# Patient Record
Sex: FEMALE | Race: WHITE | NOT HISPANIC OR LATINO | URBAN - METROPOLITAN AREA
[De-identification: names, ages, dates, MRNs, and addresses within clinical notes are randomized per-mention and may not be internally consistent; named-entity substitution may affect disease eponyms.]

---

## 2018-03-20 ENCOUNTER — APPOINTMENT (RX ONLY)
Dept: URBAN - METROPOLITAN AREA CLINIC 31 | Facility: CLINIC | Age: 73
Setting detail: DERMATOLOGY
End: 2018-03-20

## 2018-03-20 DIAGNOSIS — L57.8 OTHER SKIN CHANGES DUE TO CHRONIC EXPOSURE TO NONIONIZING RADIATION: ICD-10-CM

## 2018-03-20 DIAGNOSIS — L81.4 OTHER MELANIN HYPERPIGMENTATION: ICD-10-CM

## 2018-03-20 DIAGNOSIS — D18.0 HEMANGIOMA: ICD-10-CM

## 2018-03-20 DIAGNOSIS — D22 MELANOCYTIC NEVI: ICD-10-CM

## 2018-03-20 DIAGNOSIS — L82.1 OTHER SEBORRHEIC KERATOSIS: ICD-10-CM

## 2018-03-20 PROBLEM — D18.01 HEMANGIOMA OF SKIN AND SUBCUTANEOUS TISSUE: Status: ACTIVE | Noted: 2018-03-20

## 2018-03-20 PROBLEM — D22.5 MELANOCYTIC NEVI OF TRUNK: Status: ACTIVE | Noted: 2018-03-20

## 2018-03-20 PROBLEM — L57.0 ACTINIC KERATOSIS: Status: ACTIVE | Noted: 2018-03-20

## 2018-03-20 PROBLEM — E78.5 HYPERLIPIDEMIA, UNSPECIFIED: Status: ACTIVE | Noted: 2018-03-20

## 2018-03-20 PROBLEM — I10 ESSENTIAL (PRIMARY) HYPERTENSION: Status: ACTIVE | Noted: 2018-03-20

## 2018-03-20 PROCEDURE — ? COUNSELING

## 2018-03-20 PROCEDURE — 99213 OFFICE O/P EST LOW 20 MIN: CPT

## 2018-03-20 ASSESSMENT — LOCATION DETAILED DESCRIPTION DERM
LOCATION DETAILED: RIGHT INFERIOR CENTRAL MALAR CHEEK
LOCATION DETAILED: LEFT PROXIMAL DORSAL FOREARM
LOCATION DETAILED: INFERIOR THORACIC SPINE
LOCATION DETAILED: RIGHT DISTAL DORSAL FOREARM
LOCATION DETAILED: LEFT DISTAL DORSAL FOREARM
LOCATION DETAILED: STERNAL NOTCH
LOCATION DETAILED: LEFT INFERIOR CENTRAL MALAR CHEEK
LOCATION DETAILED: UPPER STERNUM
LOCATION DETAILED: LEFT INFERIOR MEDIAL MIDBACK
LOCATION DETAILED: RIGHT PROXIMAL DORSAL FOREARM
LOCATION DETAILED: RIGHT SUPERIOR MEDIAL MIDBACK

## 2018-03-20 ASSESSMENT — LOCATION SIMPLE DESCRIPTION DERM
LOCATION SIMPLE: UPPER BACK
LOCATION SIMPLE: CHEST
LOCATION SIMPLE: LEFT LOWER BACK
LOCATION SIMPLE: LEFT FOREARM
LOCATION SIMPLE: RIGHT CHEEK
LOCATION SIMPLE: RIGHT FOREARM
LOCATION SIMPLE: RIGHT LOWER BACK
LOCATION SIMPLE: LEFT CHEEK

## 2018-03-20 ASSESSMENT — LOCATION ZONE DERM
LOCATION ZONE: TRUNK
LOCATION ZONE: ARM
LOCATION ZONE: FACE

## 2022-12-13 ENCOUNTER — PHYSICAL THERAPY (OUTPATIENT)
Dept: PHYSICAL THERAPY | Facility: REHABILITATION | Age: 77
End: 2022-12-13
Attending: SURGERY
Payer: MEDICARE

## 2022-12-13 DIAGNOSIS — I89.0 LYMPHEDEMA: ICD-10-CM

## 2022-12-13 PROCEDURE — 97161 PT EVAL LOW COMPLEX 20 MIN: CPT

## 2022-12-13 NOTE — OP THERAPY EVALUATION
"  Outpatient Physical Therapy  LYMPHEDEMA THERAPY INITIAL EVALUATION    [facility] Physical Therapy [address]  [address]  [place] [address]  Phone:  [phone]  Fax:  [phone]    Date of Evaluation: 12/13/2022    Patient: Charity Zarate  YOB: 1945  MRN: [id number]     Referring Provider: Brian Crow M.D.  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 Lymphedema  I89.0       Subjective:   History of Present Illness:     Mechanism of injury:  Per chart 4/24/19: \"73-year-old woman with right upper outer breast clinical stage IIIa T2 N2 aM0 invasive ductal carcinoma, grade 3, ER WA positive HER-2/marta negative, post neoadjuvant TC x4, right breast lumpectomy, ALND, and bilateral reduction mammoplasty with residual YPT2N2 cancer, RCB class III\"    In the first part of November, pt reports her R UE flared quite a bit. She unfortunately for the past eight months she was concerned regarding her fatigue levels and in late June-August had been consistently misdiagnosed until November 15 where she learned she has had a recurrence of her cancer. She reports her R breast is now twice as big as her L breast. Her R arm is also as large such that she cannot lift it without assist from her L arm. She is to have a PET scan soon.     Has a Tactile Flexitouch and uses it regularly. Also performs self-MLD. She was seeing a CDT in [place] but unfortunately her therapist went out on maternity leave. Does have compression garments but now they do not fit.     No past medical history on file.  No past surgical history on file.  Social " "History     Tobacco Use    Smoking status: Not on file    Smokeless tobacco: Not on file   Substance Use Topics    Alcohol use: Not on file     Family and Occupational History     Socioeconomic History    Marital status:      Spouse name: Not on file    Number of children: Not on file    Years of education: Not on file    Highest education level: Not on file   Occupational History    Not on file       Lymphedema Objective      Left Upper Extremity Circumferential Measurements  Other: cm  Areola: cm  Breast Crease: cm  Axilla: cm  Upper Proximal Humerus: 37.1 cm  Distal Humerus: 33.2 cm  Elbow: 27.3 cm  Mid-Forearm: 25.4 cm  Wrist: 15.9 cm  Distal Briggs Crease: 17.9 cm  Total: 156.8 cm    Right Upper Extremity Circumferential Measurements  Other: cm  Areola: cm  Breast Crease: cm  Axilla: 41.2 cm  Upper Proximal Humerus: 39.4 cm  Distal Humerus: 35.4 cm  Elbow: 32.1 cm  Mid-Forearm: 31.5 cm  Wrist: 17.6 cm  Distal Briggs Crease: 18.4 cm  Total: cm 215.6      Lymphedema Stage  Stage 3 Lymphedema    Other Notes  5. Distal Humerus: below the \"scar\"    ROM:    L UE: without impairment, Full ROM in all planes    R UE: flexion to 15degrees. Abduction: 10degrees. Unable to horizontally ab/adduct. Supination/pronation intact. For any motion beyond: must be active assist. Passively, unable to lift arm greater than 80degrees into both flexion and abduction. Firm endfeel.       Therapeutic Treatments and Modalities:     Therapeutic Treatment and Modalities Summary: -Educated pt on the components of CDT. Pt is familiar with the pathogenesis of lymphedema as she was previously seen during her first bout of cancer.     -discussed, in detail, the risk of wrapping arm today to achieve reduction. Risks include spreading disease through the lymphatics. Pt would like her arm to be wrapped to improve her quality of life/functionality. She is unable to lift arm overhead, dress without assist from L UE, wash hair with R UE. She " has been informed of the possibility of cancer spread. She has consented to be wrapped and was therefore wrapped in multilayer compression to decongest her R UE.   Time-based treatments/modalities:           Assessment and Plan:   Functional Impairments: swelling    Assessment details:  Pt is a 76yo F with hx of R breast cancer, treated with lumpectomy, chemo, and radiation. Eight months ago, she noticed tissue changes to her R breast and skin. She is currently undergoing workup for cancer recurrence and unfortunately has suffered a significant increase in swelling to her R arm and breast such that she cannot perform basic activities such as dressing, washing hair, or reaching to cabinets with her R UE. She arrives today searching for a method to reduce the R UE. Certainly, Charity is demonstrating a severe flare in her lymphedema, likely due to recurrence. She has been educated on Complete Decongestive Therapy and the possibility of spreading disease with the efforts to reduce her arm/breast. Charity has verbalized understanding to the risk and wishes to proceed with reduction to improve her quality of life. Services will be provided by a certified lymphedema therapist.  Complete decongestive therapy is considered the gold standard of medical practice for lymphedema treatment and has proven to be effective for reduction in limb volume size and decrease risk of complications secondary to swelling (such as wounds and infections).    Patient to be seen until decongestion occurs. Physical therapy interventions to include manual lymphatic drainage, compression bandaging, skin care education, wound care if applicable, therapeutic exercises, custom garment fitting and lymphedema education to improve skin integrity, decrease lymphedema swelling, improve joint arthrokinematics and range of motion and improve quality of life.    Spontaneous recovery is not expected without skilled completed decongestive lymphedema  therapy.    The patient was informed of evaluation findings and intervention plan and agrees to participate in the plan as outlined.   Prognosis: fair      Goals:   Short Term Goals:  1. Pt will achieve a total limb circumference reduction of 10cm in order to decrease risk for infection and progression of disease process.  2. Pt will be independent with self-MLD to facilitate fluid migration to healthy tissues and lymph nodes.   3. Pt will consistently perform exercises with bandages on to facilitate muscle pump of fluid from affected extremity.     Short term goal time span:  2-4 weeks    Long Term Goals:  1. Pt will have a reduction in total limb circumference of 20cm in order to decrease risk for infection and progression of disease process.   2. Patient will be independent with maintenance phase management of lymphedema including: compression garments, skin care education, risk reduction strategies, manual lymphatic drainage, and therapeutic exercise.   Long term goal time span:  4-6 weeks    Plan:  Therapy options:  Physical therapy treatment to continue  Planned therapy interventions:  Compression bandaging, compression sleeve, compression glove, decongestive exercises, home exercise program, intermittent compression, manual lymph drainage, Velcro wraps, strengthening exercises, soft tissue manual techniques (CPT 73306), skin/wound care, short stretch bandages, sequential compression pump, self-care/training (CPT 70342), referral for compression garment & instructions for don/doffing, range of motion exercises, postural exercises, patient education, orthotic measurements/fitting and myofascial release techniques  Planned education:  Functional anatomy and physiology of the lymphatic system, pathophysiology of lymphedema, lymphedema exercise, lymphedema precautions, proper skin care/nutrition, compression bandaging, self massage, infection prevention, scar tissue management, breast cancer rehab, activity  guidelines, dietary guidelines, skin care guidelines, home pump use, bandage removal and long term self-management of lymphedema  Frequency:  2x week  Duration in weeks:  8  Discussed with:  Patient    Functional Assessment Used    LLIS    Referring provider co-signature:  I have reviewed this plan of care and my co-signature certifies the need for services.    Certification Period: 12/13/2022 to  02/07/23    Physician Signature: ________________________________ Date: ______________

## 2022-12-15 ENCOUNTER — PHYSICAL THERAPY (OUTPATIENT)
Dept: PHYSICAL THERAPY | Facility: REHABILITATION | Age: 77
End: 2022-12-15
Attending: SURGERY
Payer: MEDICARE

## 2022-12-15 DIAGNOSIS — I89.0 LYMPHEDEMA: ICD-10-CM

## 2022-12-15 PROCEDURE — 97140 MANUAL THERAPY 1/> REGIONS: CPT | Mod: KX

## 2022-12-15 NOTE — OP THERAPY DAILY TREATMENT
Outpatient Physical Therapy  LYMPHEDEMA THERAPY DAILY TREATMENT     [facility] Physical Therapy [address]  [address]  [place] [address]  Phone:  [phone]  Fax:  [phone]    Date: 12/15/2022    Patient: Charity Zarate  YOB: 1945  MRN: [id number]     Time Calculation    Start time: 0902  Stop time: 0949 Time Calculation (min): 47 minutes         Chief Complaint: Lymphedema Breast Cancer    Visit #: 2    Subjective:   History of Present Illness:     Mechanism of injury:  Arm easier to lift.     Lymphedema Objective    Right Upper Extremity Circumferential Measurements  Other: cm  Areola: cm  Breast Crease: cm  Axilla: 40.1 cm  Upper Proximal Humerus: 38.8 cm  Distal Humerus: 33.5 cm  Elbow: 30.4 cm  Mid-Forearm: 29.4 cm  Wrist: 16.3 cm  Distal Briggs Crease: 17.2 cm  Total: cm 205.7        Left Upper Extremity Circumferential Measurements 12/13 EVAL  Other: cm  Areola: cm  Breast Crease: cm  Axilla: WILL NEED TO BE MEASURED AND ENTERED  Upper Proximal Humerus: 37.1 cm  Distal Humerus: 33.2 cm  Elbow: 27.3 cm  Mid-Forearm: 25.4 cm  Wrist: 15.9 cm  Distal Briggs Crease: 17.9 cm  Total: 156.8 cm THIS IS WITHOUT AXILLA     Right Upper Extremity Circumferential Measurements 12/13 EVAL  Other: cm  Areola: cm  Breast Crease: cm  Axilla: 41.2 cm  Upper Proximal Humerus: 39.4 cm  Distal Humerus: 35.4 cm  Elbow: 32.1 cm  Mid-Forearm: 31.5 cm  Wrist: 17.6 cm  Distal Briggs Crease: 18.4 cm  Total: cm 215.6    Therapeutic Treatments and Modalities:     1. Manual Therapy (CPT 18917)    Therapeutic Treatment and Modalities Summary: MLD to R UE with trunk involvement. Focus on R UE and accompanying anastomoses. Fibrotic technique to R areola.   The purpose of Manual Lymph Drainage (MLD) is to reduce lymph volume in the affected limb by increasing intake of lymphatic load into the lymphatic system, increasing the volume of transported lymph fluid, moving lymph fluid in superficial lymph vessels  to collateral lymph collectors, anastomoses, or tissue channels, and increasing venous return. The goal of MLD is to re-route the lymph flow around blocked areas into more centrally located healthy lymph vessels, which drain into the venous system.      Multi-layer compression bandages applied to R UE utilizing the technique of 50% overlap and 50% stretch. Stockinette has been applied as a protective base layer with padding overlay to further protect skin from short-stretch bandages. Short stretch bandages in the size of 6cm, 8cm, 10cm x2 were utilized to complete compression to all aspects of affected limb. The high working pressure and low resting pressure qualities of short-stretch bandages make them the preferred compression bandage in the management of lymphedema, especially during the decongestive phase (phase I) of Complete Decongestive Therapy (CDT). Utilization of theses bandages prevents the re-accumulation of evacuated lymph fluid and conserves the results achieved during MLD. The bandages further serve to break up and soften deposits of of connective tissue and scar tissue that have formed prior to intervention.    Time-based treatments/modalities:    Physical Therapy Timed Treatment Charges  Manual therapy minutes (CPT 46666): 47 minutes      Assessment and Plan:   Assessment details:  Charity has lost 10cm from her R LE since initiating CDT. She will benefit from further intervention to achieve further reduction to her R UE.     Plan:  Therapy options:  Physical therapy treatment to continue  Discussed with:  Patient

## 2022-12-19 ENCOUNTER — APPOINTMENT (OUTPATIENT)
Dept: PHYSICAL THERAPY | Facility: REHABILITATION | Age: 77
End: 2022-12-19
Attending: SURGERY
Payer: MEDICARE

## 2022-12-20 ENCOUNTER — PHYSICAL THERAPY (OUTPATIENT)
Dept: PHYSICAL THERAPY | Facility: REHABILITATION | Age: 77
End: 2022-12-20
Attending: SURGERY
Payer: MEDICARE

## 2022-12-20 DIAGNOSIS — I89.0 LYMPHEDEMA: ICD-10-CM

## 2022-12-20 PROCEDURE — 97140 MANUAL THERAPY 1/> REGIONS: CPT | Mod: KX

## 2022-12-20 NOTE — OP THERAPY DAILY TREATMENT
Outpatient Physical Therapy  LYMPHEDEMA THERAPY DAILY TREATMENT     [facility] Therapy [address].  [address]  [place] [address]  Phone:  [phone]  Fax:  [phone]    Date: 12/20/2022    Patient: Charity Zarate  YOB: 1945  MRN: [id number]     Time Calculation    Start time: 1500  Stop time: 1548 Time Calculation (min): 48 minutes         Chief Complaint: Lymphedema Breast Cancer    Visit #: 3    Subjective:   History of Present Illness:     Mechanism of injury:  Pt received her PET scan results which noted hypermatabolic R supraclavicular lymphadenopathy, R subpectoral lymphadenopathy,  bilateral axillary lymphadenopathy, right chest wall lymphadenopathy, mediatsinal lymphadenopathy, bilateral internal mammary and pericardial lymphadenopathy.     Lymphedema Objective    Right Upper Extremity Circumferential Measurements  Other: cm  Areola: cm  Breast Crease: cm  Axilla: 43.1 cm  Upper Proximal Humerus: 41.2 cm  Distal Humerus: 33.5 cm  Elbow: 27.7 cm  Mid-Forearm: 27 cm  Wrist: 15.8 cm  Distal Briggs Crease: 17.2 cm  Total: cm 205.5        Right Upper Extremity Circumferential Measurements 12/15  Other: cm  Areola: cm  Breast Crease: cm  Axilla: 40.1 cm  Upper Proximal Humerus: 38.8 cm  Distal Humerus: 33.5 cm  Elbow: 30.4 cm  Mid-Forearm: 29.4 cm  Wrist: 16.3 cm  Distal Briggs Crease: 17.2 cm  Total: cm 205.7          Left Upper Extremity Circumferential Measurements 12/13 EVAL  Other: cm  Areola: cm  Breast Crease: cm  Axilla: WILL NEED TO BE MEASURED AND ENTERED  Upper Proximal Humerus: 37.1 cm  Distal Humerus: 33.2 cm  Elbow: 27.3 cm  Mid-Forearm: 25.4 cm  Wrist: 15.9 cm  Distal Briggs Crease: 17.9 cm  Total: 156.8 cm THIS IS WITHOUT AXILLA     Right Upper Extremity Circumferential Measurements 12/13 EVAL  Other: cm  Areola: cm  Breast Crease: cm  Axilla: 41.2 cm  Upper Proximal Humerus: 39.4 cm  Distal Humerus: 35.4 cm  Elbow: 32.1 cm  Mid-Forearm: 31.5 cm  Wrist:  17.6 cm  Distal Briggs Crease: 18.4 cm  Total: cm 215.6    Therapeutic Treatments and Modalities:     1. Manual Therapy (CPT 17605)    Therapeutic Treatment and Modalities Summary: MLD to R UE with trunk involvement. Focus on R UE and AAA only. AI avoided while she is awaiting her POC.   The purpose of Manual Lymph Drainage (MLD) is to reduce lymph volume in the affected limb by increasing intake of lymphatic load into the lymphatic system, increasing the volume of transported lymph fluid, moving lymph fluid in superficial lymph vessels to collateral lymph collectors, anastomoses, or tissue channels, and increasing venous return. The goal of MLD is to re-route the lymph flow around blocked areas into more centrally located healthy lymph vessels, which drain into the venous system.      Re-applied multilayer compression to limit fluid accumulation to R UE.   Time-based treatments/modalities:    Physical Therapy Timed Treatment Charges  Manual therapy minutes (CPT 41000): 48 minutes      Assessment and Plan:   Assessment details:  Charity has sustained her measurements from previous session. It does appear as though a fluid migration is occurring from distal to proximal, which is expected and positive. Discussed methods of achieving consistent proximal fluid removal by re-doing last bandage. She will benefit from further intervention to decongest her R UE.     Plan:  Therapy options:  Physical therapy treatment to continue  Discussed with:  Patient

## 2022-12-21 ENCOUNTER — APPOINTMENT (OUTPATIENT)
Dept: PHYSICAL THERAPY | Facility: REHABILITATION | Age: 77
End: 2022-12-21
Attending: SURGERY
Payer: MEDICARE

## 2022-12-27 ENCOUNTER — APPOINTMENT (OUTPATIENT)
Dept: PHYSICAL THERAPY | Facility: REHABILITATION | Age: 77
End: 2022-12-27
Attending: SURGERY
Payer: MEDICARE

## 2022-12-27 NOTE — OP THERAPY DAILY TREATMENT
Outpatient Physical Therapy  LYMPHEDEMA THERAPY DAILY TREATMENT     [facility] Physical [address].  [address]  [place] [address]  Phone:  [phone]  Fax:  [phone]    Date: 12/27/2022    Patient: Charity Zarate  YOB: 1945  MRN: [id number]     Time Calculation                   Chief Complaint: No chief complaint on file.    Visit #: 4    Subjective    Lymphedema Objective    Right Upper Extremity Circumferential Measurements 12/20  Other: cm  Areola: cm  Breast Crease: cm  Axilla: 43.1 cm  Upper Proximal Humerus: 41.2 cm  Distal Humerus: 33.5 cm  Elbow: 27.7 cm  Mid-Forearm: 27 cm  Wrist: 15.8 cm  Distal Briggs Crease: 17.2 cm  Total: cm 205.5          Right Upper Extremity Circumferential Measurements 12/15  Other: cm  Areola: cm  Breast Crease: cm  Axilla: 40.1 cm  Upper Proximal Humerus: 38.8 cm  Distal Humerus: 33.5 cm  Elbow: 30.4 cm  Mid-Forearm: 29.4 cm  Wrist: 16.3 cm  Distal Briggs Crease: 17.2 cm  Total: cm 205.7     Left Upper Extremity Circumferential Measurements 12/13 EVAL  Other: cm  Areola: cm  Breast Crease: cm  Axilla: WILL NEED TO BE MEASURED AND ENTERED  Upper Proximal Humerus: 37.1 cm  Distal Humerus: 33.2 cm  Elbow: 27.3 cm  Mid-Forearm: 25.4 cm  Wrist: 15.9 cm  Distal Briggs Crease: 17.9 cm  Total: 156.8 cm THIS IS WITHOUT AXILLA     Right Upper Extremity Circumferential Measurements 12/13 EVAL  Other: cm  Areola: cm  Breast Crease: cm  Axilla: 41.2 cm  Upper Proximal Humerus: 39.4 cm  Distal Humerus: 35.4 cm  Elbow: 32.1 cm  Mid-Forearm: 31.5 cm  Wrist: 17.6 cm  Distal Briggs Crease: 18.4 cm  Total: cm 215.6  Exercises/Treatment  Time-based treatments/modalities:           LYMPHEDEMA ASSESSMENT AND PLAN

## 2022-12-28 ENCOUNTER — APPOINTMENT (OUTPATIENT)
Dept: PHYSICAL THERAPY | Facility: REHABILITATION | Age: 77
End: 2022-12-28
Attending: SURGERY
Payer: MEDICARE

## 2022-12-29 ENCOUNTER — APPOINTMENT (OUTPATIENT)
Dept: PHYSICAL THERAPY | Facility: REHABILITATION | Age: 77
End: 2022-12-29
Attending: SURGERY
Payer: MEDICARE

## 2023-01-03 ENCOUNTER — APPOINTMENT (OUTPATIENT)
Dept: PHYSICAL THERAPY | Facility: REHABILITATION | Age: 78
End: 2023-01-03
Attending: SURGERY
Payer: MEDICARE

## 2023-01-03 NOTE — OP THERAPY DAILY TREATMENT
Outpatient Physical Therapy  LYMPHEDEMA THERAPY DAILY TREATMENT     [facility] Physical [address].  [address]  [place] [address]  Phone:  [phone]  Fax:  [phone]    Date: 01/03/2023    Patient: Charity Zarate  YOB: 1945  MRN: [id number]     Time Calculation                   Chief Complaint: No chief complaint on file.    Visit #: 4    Subjective    Lymphedema Objective    Right Upper Extremity Circumferential Measurements  12/20  Other: cm  Areola: cm  Breast Crease: cm  Axilla: 43.1 cm  Upper Proximal Humerus: 41.2 cm  Distal Humerus: 33.5 cm  Elbow: 27.7 cm  Mid-Forearm: 27 cm  Wrist: 15.8 cm  Distal Briggs Crease: 17.2 cm  Total: cm 205.5          Right Upper Extremity Circumferential Measurements 12/15  Other: cm  Areola: cm  Breast Crease: cm  Axilla: 40.1 cm  Upper Proximal Humerus: 38.8 cm  Distal Humerus: 33.5 cm  Elbow: 30.4 cm  Mid-Forearm: 29.4 cm  Wrist: 16.3 cm  Distal Briggs Crease: 17.2 cm  Total: cm 205.7          Left Upper Extremity Circumferential Measurements 12/13 EVAL  Other: cm  Areola: cm  Breast Crease: cm  Axilla: WILL NEED TO BE MEASURED AND ENTERED  Upper Proximal Humerus: 37.1 cm  Distal Humerus: 33.2 cm  Elbow: 27.3 cm  Mid-Forearm: 25.4 cm  Wrist: 15.9 cm  Distal Briggs Crease: 17.9 cm  Total: 156.8 cm THIS IS WITHOUT AXILLA     Right Upper Extremity Circumferential Measurements 12/13 EVAL  Other: cm  Areola: cm  Breast Crease: cm  Axilla: 41.2 cm  Upper Proximal Humerus: 39.4 cm  Distal Humerus: 35.4 cm  Elbow: 32.1 cm  Mid-Forearm: 31.5 cm  Wrist: 17.6 cm  Distal Briggs Crease: 18.4 cm  Total: cm 215.6     Exercises/Treatment  Time-based treatments/modalities:           LYMPHEDEMA ASSESSMENT AND PLAN

## 2023-01-05 ENCOUNTER — APPOINTMENT (OUTPATIENT)
Dept: PHYSICAL THERAPY | Facility: REHABILITATION | Age: 78
End: 2023-01-05
Attending: SURGERY
Payer: MEDICARE

## 2023-01-10 ENCOUNTER — APPOINTMENT (OUTPATIENT)
Dept: PHYSICAL THERAPY | Facility: REHABILITATION | Age: 78
End: 2023-01-10
Attending: SURGERY
Payer: MEDICARE

## 2023-01-12 ENCOUNTER — PHYSICAL THERAPY (OUTPATIENT)
Dept: PHYSICAL THERAPY | Facility: REHABILITATION | Age: 78
End: 2023-01-12
Attending: SURGERY
Payer: MEDICARE

## 2023-01-12 DIAGNOSIS — I89.0 LYMPHEDEMA: ICD-10-CM

## 2023-01-12 PROCEDURE — 97140 MANUAL THERAPY 1/> REGIONS: CPT

## 2023-01-12 NOTE — OP THERAPY DAILY TREATMENT
Outpatient Physical Therapy  LYMPHEDEMA THERAPY DAILY TREATMENT     [facility] Physical Therapy [address].  [address]  [place] [address]  Phone:  [phone]  Fax:  [phone]    Date: 01/12/2023    Patient: Charity Zarate  YOB: 1945  MRN: [id number]     Time Calculation    Start time: 1631  Stop time: 1720 Time Calculation (min): 49 minutes         Chief Complaint: Lymphedema Breast Cancer    Visit #: 4    Subjective:   History of Present Illness:     Mechanism of injury:  Really has been working on hand.     Lymphedema Objective    Right Upper Extremity Circumferential Measurements  Other: cm  Areola: cm  Breast Crease: cm  Axilla: 40.9 cm  Upper Proximal Humerus: 39.3 cm  Distal Humerus: 34 cm  Elbow: 29.8 cm  Mid-Forearm: 29.6 cm  Wrist: 17.1 cm  Distal Briggs Crease: 17.8 cm  Total: cm 208.5        Right Upper Extremity Circumferential Measurements 12/20  Other: cm  Areola: cm  Breast Crease: cm  Axilla: 43.1 cm  Upper Proximal Humerus: 41.2 cm  Distal Humerus: 33.5 cm  Elbow: 27.7 cm  Mid-Forearm: 27 cm  Wrist: 15.8 cm  Distal Briggs Crease: 17.2 cm  Total: cm 205.5          Right Upper Extremity Circumferential Measurements 12/15  Other: cm  Areola: cm  Breast Crease: cm  Axilla: 40.1 cm  Upper Proximal Humerus: 38.8 cm  Distal Humerus: 33.5 cm  Elbow: 30.4 cm  Mid-Forearm: 29.4 cm  Wrist: 16.3 cm  Distal Briggs Crease: 17.2 cm  Total: cm 205.7          Left Upper Extremity Circumferential Measurements 12/13 EVAL  Other: cm  Areola: cm  Breast Crease: cm  Axilla: WILL NEED TO BE MEASURED AND ENTERED  Upper Proximal Humerus: 37.1 cm  Distal Humerus: 33.2 cm  Elbow: 27.3 cm  Mid-Forearm: 25.4 cm  Wrist: 15.9 cm  Distal Briggs Crease: 17.9 cm  Total: 156.8 cm THIS IS WITHOUT AXILLA     Right Upper Extremity Circumferential Measurements 12/13 EVAL  Other: cm  Areola: cm  Breast Crease: cm  Axilla: 41.2 cm  Upper Proximal Humerus: 39.4 cm  Distal Humerus: 35.4 cm  Elbow: 32.1  cm  Mid-Forearm: 31.5 cm  Wrist: 17.6 cm  Distal Briggs Crease: 18.4 cm  Total: cm 215.6    Therapeutic Treatments and Modalities:     1. Manual Therapy (CPT 79299)    Therapeutic Treatment and Modalities Summary: MLD to R UE with trunk involvement. The purpose of Manual Lymph Drainage (MLD) is to reduce lymph volume in the affected limb by increasing intake of lymphatic load into the lymphatic system, increasing the volume of transported lymph fluid, moving lymph fluid in superficial lymph vessels to collateral lymph collectors, anastomoses, or tissue channels, and increasing venous return. The goal of MLD is to re-route the lymph flow around blocked areas into more centrally located healthy lymph vessels, which drain into the venous system.      Re-applied multilayer compression to limit fluid accumulation to R UE.   Time-based treatments/modalities:    Physical Therapy Timed Treatment Charges  Manual therapy minutes (CPT 14969): 49 minutes      Assessment and Plan:   Assessment details:  Charity has sustained a slight increase to her R UE but has been diligent with her compression wraps as well as activity. Arm subjectively feels lighter. Unclear how much reduction is possible given the presence of active cancer. May be able to trial an additional layer for increased compression if she sustains another increase next time.     Plan:  Therapy options:  Physical therapy treatment to continue  Discussed with:  Patient

## 2023-01-17 ENCOUNTER — PHYSICAL THERAPY (OUTPATIENT)
Dept: PHYSICAL THERAPY | Facility: REHABILITATION | Age: 78
End: 2023-01-17
Attending: SURGERY
Payer: MEDICARE

## 2023-01-17 DIAGNOSIS — I89.0 LYMPHEDEMA: ICD-10-CM

## 2023-01-17 PROCEDURE — 97140 MANUAL THERAPY 1/> REGIONS: CPT

## 2023-01-17 NOTE — OP THERAPY DAILY TREATMENT
"  Outpatient Physical Therapy  LYMPHEDEMA THERAPY DAILY TREATMENT     [facility] Physical [address].  [address]  [place] [address]  Phone:  [phone]  Fax:  [phone]    Date: 01/17/2023    Patient: Charity Zarate  YOB: 1945  MRN: [id number]     Time Calculation    Start time: 1330  Stop time: 1416 Time Calculation (min): 46 minutes         Chief Complaint: Lymphedema Breast Cancer    Visit #: 5    Subjective:   History of Present Illness:     Mechanism of injury:  R forearm is painful for patient lately. Swelling at that location has been \"fussy\" as well.     Lymphedema Objective    Right Upper Extremity Circumferential Measurements  Other: cm  Areola: cm  Breast Crease: cm  Axilla: 42.3 cm  Upper Proximal Humerus: 39.9 cm  Distal Humerus: 35.6 cm  Elbow: 30.2 cm  Mid-Forearm: 30.2 cm  Wrist: 16.7 cm  Distal Briggs Crease: 18 cm  Total: cm 212.9        Right Upper Extremity Circumferential Measurements 1/12  Other: cm  Areola: cm  Breast Crease: cm  Axilla: 40.9 cm  Upper Proximal Humerus: 39.3 cm  Distal Humerus: 34 cm  Elbow: 29.8 cm  Mid-Forearm: 29.6 cm  Wrist: 17.1 cm  Distal Briggs Crease: 17.8 cm  Total: cm 208.5          Right Upper Extremity Circumferential Measurements 12/20  Other: cm  Areola: cm  Breast Crease: cm  Axilla: 43.1 cm  Upper Proximal Humerus: 41.2 cm  Distal Humerus: 33.5 cm  Elbow: 27.7 cm  Mid-Forearm: 27 cm  Wrist: 15.8 cm  Distal Briggs Crease: 17.2 cm  Total: cm 205.5          Right Upper Extremity Circumferential Measurements 12/15  Other: cm  Areola: cm  Breast Crease: cm  Axilla: 40.1 cm  Upper Proximal Humerus: 38.8 cm  Distal Humerus: 33.5 cm  Elbow: 30.4 cm  Mid-Forearm: 29.4 cm  Wrist: 16.3 cm  Distal Briggs Crease: 17.2 cm  Total: cm 205.7          Left Upper Extremity Circumferential Measurements 12/13 EVAL  Other: cm  Areola: cm  Breast Crease: cm  Axilla: WILL NEED TO BE MEASURED AND ENTERED  Upper Proximal Humerus: 37.1 " cm  Distal Humerus: 33.2 cm  Elbow: 27.3 cm  Mid-Forearm: 25.4 cm  Wrist: 15.9 cm  Distal Briggs Crease: 17.9 cm  Total: 156.8 cm THIS IS WITHOUT AXILLA     Right Upper Extremity Circumferential Measurements 12/13 EVAL  Other: cm  Areola: cm  Breast Crease: cm  Axilla: 41.2 cm  Upper Proximal Humerus: 39.4 cm  Distal Humerus: 35.4 cm  Elbow: 32.1 cm  Mid-Forearm: 31.5 cm  Wrist: 17.6 cm  Distal Briggs Crease: 18.4 cm  Total: cm 215.6    Therapeutic Treatments and Modalities:     1. Manual Therapy (CPT 48522)    Therapeutic Treatment and Modalities Summary: MLD to R UE with trunk involvement. The purpose of Manual Lymph Drainage (MLD) is to reduce lymph volume in the affected limb by increasing intake of lymphatic load into the lymphatic system, increasing the volume of transported lymph fluid, moving lymph fluid in superficial lymph vessels to collateral lymph collectors, anastomoses, or tissue channels, and increasing venous return. The goal of MLD is to re-route the lymph flow around blocked areas into more centrally located healthy lymph vessels, which drain into the venous system.      Re-applied multilayer compression to limit fluid accumulation to R UE. Added an additional 8cm bandage to increase compression to all aspects of R UE.   Time-based treatments/modalities:    Physical Therapy Timed Treatment Charges  Manual therapy minutes (CPT 62514): 46 minutes      Assessment and Plan:   Assessment details:  Pt has sustained an increase to her R UE; question of due to requiring increased compression or due to pathologic features of her swelling. Will re-assess next week to evaluate for plateau.     Plan:  Therapy options:  Physical therapy treatment to continue  Discussed with:  Patient

## 2023-01-18 NOTE — OP THERAPY PROGRESS SUMMARY
Outpatient Physical Therapy  PROGRESS SUMMARY NOTE      [facility] [address].  [address]  [place] [address]  Phone:  [phone]  Fax:  [phone]    Date of Visit: 01/17/2023    Patient: Charity Zarate  YOB: 1945  MRN: [id number]     Referring Provider: Brian Crow M.D.  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 Lymphedema  I89.0       Rehab Potential: fair    Progress Report Period: 12/13/22-1/17/23    Functional Assessment Used    LLIS      Objective Findings and Assessment:   Patient progression towards goals: Charity has been seen for 5 visits of CDT. At her best, she achieved a 10cm reduction to her total R UE. During the past few visits she has sustained a gradual increase in swelling such that now she is within 3cm of her initial measurements. There is a possibility that the presence of active cancer is limiting fluid removal from the limb as multiple alternative pathways of lymphatic flow cannot be accessed. Today, applied an additional layer of compression to evaluate if an increase in containment and compression force will facilitate more fluid removal. If not, it is possible the arm cannot be completely reduced with the current state of her cancer. Certainly, this will be consistently evaluated.     Objective findings and assessment details: Right Upper Extremity Circumferential Measurements 1/17  Axilla: 42.3 cm  Upper Proximal Humerus: 39.9 cm  Distal Humerus: 35.6 cm  Elbow: 30.2 cm  Mid-Forearm: 30.2 cm  Wrist: 16.7 cm  Distal Briggs Crease: 18 cm  Total: cm 212.9    Right Upper Extremity Circumferential Measurements 12/13 EVAL  Axilla: 41.2 cm  Upper Proximal Humerus: 39.4 cm  Distal Humerus: 35.4 cm  Elbow: 32.1  cm  Mid-Forearm: 31.5 cm  Wrist: 17.6 cm  Distal Briggs Crease: 18.4 cm  Total: cm 215.6    Goals:   Short Term Goals:   1. Pt will achieve a total limb circumference reduction of 10cm in order to decrease risk for infection and progression of disease process. Goal partially met  2. Pt will be independent with self-MLD to facilitate fluid migration to healthy tissues and lymph nodes. Goal Met  3. Pt will consistently perform exercises with bandages on to facilitate muscle pump of fluid from affected extremity. Goal Met  Short term goal time span:  2-4 weeks      Long Term Goals:     1. Pt will have a reduction in total limb circumference of 20cm in order to decrease risk for infection and progression of disease process. Goal Not Met  2. Patient will be independent with maintenance phase management of lymphedema including: compression garments, skin care education, risk reduction strategies, manual lymphatic drainage, and therapeutic exercise. Goal Not Met  Long term goal time span:  4-6 weeks    Plan:   Planned therapy interventions:  Manual Therapy (CPT 49214), Functional Training, Self Care (CPT 06487), Therapeutic Activities (CPT 89273), Therapeutic Exercise (CPT 97044) and Vasopneumatic Pump Therapy (CPT 87508)  Frequency:  2x week  Duration in weeks:  8    Referring provider co-signature:  I have reviewed this plan of care and my co-signature certifies the need for services.     Certification Period: 01/17/2023 to 03/14/23    Physician Signature: ________________________________ Date: ______________

## 2023-01-19 ENCOUNTER — APPOINTMENT (OUTPATIENT)
Dept: PHYSICAL THERAPY | Facility: REHABILITATION | Age: 78
End: 2023-01-19
Attending: SURGERY
Payer: MEDICARE

## 2023-01-24 ENCOUNTER — PHYSICAL THERAPY (OUTPATIENT)
Dept: PHYSICAL THERAPY | Facility: REHABILITATION | Age: 78
End: 2023-01-24
Attending: SURGERY
Payer: MEDICARE

## 2023-01-24 DIAGNOSIS — I89.0 LYMPHEDEMA: ICD-10-CM

## 2023-01-24 PROCEDURE — 97140 MANUAL THERAPY 1/> REGIONS: CPT

## 2023-01-24 NOTE — OP THERAPY DAILY TREATMENT
Outpatient Physical Therapy  LYMPHEDEMA THERAPY DAILY TREATMENT     [facility] Physical [address].  [address]  [place] [address]  Phone:  [phone]  Fax:  [phone]    Date: 01/24/2023    Patient: Charity Zarate  YOB: 1945  MRN: [id number]     Time Calculation    Start time: 1330  Stop time: 1416 Time Calculation (min): 46 minutes         Chief Complaint: Lymphedema Breast Cancer    Visit #: 6    Subjective:   History of Present Illness:     Mechanism of injury:  Arm feels better. Additional wrap seemed helpful.     Lymphedema Objective    Right Upper Extremity Circumferential Measurements  Other: cm  Areola: cm  Breast Crease: cm  Axilla: 41.6 cm  Upper Proximal Humerus: 38.4 cm  Distal Humerus: 34.8 cm  Elbow: 31.7 cm  Mid-Forearm: 30.8 cm  Wrist: 17.4 cm  Distal Briggs Crease: 18.1 cm  Total: cm 212.8        Right Upper Extremity Circumferential Measurements 1/17  Axilla: 42.3 cm  Upper Proximal Humerus: 39.9 cm  Distal Humerus: 35.6 cm  Elbow: 30.2 cm  Mid-Forearm: 30.2 cm  Wrist: 16.7 cm  Distal Briggs Crease: 18 cm  Total: cm 212.9          Right Upper Extremity Circumferential Measurements 1/12  Other: cm  Areola: cm  Breast Crease: cm  Axilla: 40.9 cm  Upper Proximal Humerus: 39.3 cm  Distal Humerus: 34 cm  Elbow: 29.8 cm  Mid-Forearm: 29.6 cm  Wrist: 17.1 cm  Distal Briggs Crease: 17.8 cm  Total: cm 208.5          Right Upper Extremity Circumferential Measurements 12/20  Other: cm  Areola: cm  Breast Crease: cm  Axilla: 43.1 cm  Upper Proximal Humerus: 41.2 cm  Distal Humerus: 33.5 cm  Elbow: 27.7 cm  Mid-Forearm: 27 cm  Wrist: 15.8 cm  Distal Briggs Crease: 17.2 cm  Total: cm 205.5          Right Upper Extremity Circumferential Measurements 12/15  Other: cm  Areola: cm  Breast Crease: cm  Axilla: 40.1 cm  Upper Proximal Humerus: 38.8 cm  Distal Humerus: 33.5 cm  Elbow: 30.4 cm  Mid-Forearm: 29.4 cm  Wrist: 16.3 cm  Distal Briggs Crease: 17.2 cm  Total: cm  205.7          Left Upper Extremity Circumferential Measurements 12/13 EVAL  Other: cm  Areola: cm  Breast Crease: cm  Axilla: WILL NEED TO BE MEASURED AND ENTERED  Upper Proximal Humerus: 37.1 cm  Distal Humerus: 33.2 cm  Elbow: 27.3 cm  Mid-Forearm: 25.4 cm  Wrist: 15.9 cm  Distal Briggs Crease: 17.9 cm  Total: 156.8 cm THIS IS WITHOUT AXILLA     Right Upper Extremity Circumferential Measurements 12/13 EVAL  Other: cm  Areola: cm  Breast Crease: cm  Axilla: 41.2 cm  Upper Proximal Humerus: 39.4 cm  Distal Humerus: 35.4 cm  Elbow: 32.1 cm  Mid-Forearm: 31.5 cm  Wrist: 17.6 cm  Distal Briggs Crease: 18.4 cm  Total: cm 215.6    Therapeutic Treatments and Modalities:     1. Manual Therapy (CPT 71755)    Therapeutic Treatment and Modalities Summary: MLD to R UE with trunk involvement. Utilized AIA and GABRIELA only. The purpose of Manual Lymph Drainage (MLD) is to reduce lymph volume in the affected limb by increasing intake of lymphatic load into the lymphatic system, increasing the volume of transported lymph fluid, moving lymph fluid in superficial lymph vessels to collateral lymph collectors, anastomoses, or tissue channels, and increasing venous return. The goal of MLD is to re-route the lymph flow around blocked areas into more centrally located healthy lymph vessels, which drain into the venous system.      Re-applied multilayer compression to limit fluid accumulation to R UE.   Time-based treatments/modalities:    Physical Therapy Timed Treatment Charges  Manual therapy minutes (CPT 82770): 46 minutes      Assessment and Plan:   Assessment details:  Charity has sustained her measurements since previous intervention. If she sustains next session, will discuss longer term garment options as it is possible she has reached a plateau in this stage of her treatment.     Plan:  Therapy options:  Physical therapy treatment to continue  Discussed with:  Patient

## 2023-01-26 ENCOUNTER — APPOINTMENT (OUTPATIENT)
Dept: PHYSICAL THERAPY | Facility: REHABILITATION | Age: 78
End: 2023-01-26
Attending: SURGERY
Payer: MEDICARE

## 2023-01-31 ENCOUNTER — PHYSICAL THERAPY (OUTPATIENT)
Dept: PHYSICAL THERAPY | Facility: REHABILITATION | Age: 78
End: 2023-01-31
Attending: SURGERY
Payer: MEDICARE

## 2023-01-31 DIAGNOSIS — I89.0 LYMPHEDEMA: ICD-10-CM

## 2023-01-31 PROCEDURE — 97535 SELF CARE MNGMENT TRAINING: CPT

## 2023-01-31 PROCEDURE — 97140 MANUAL THERAPY 1/> REGIONS: CPT

## 2023-01-31 NOTE — OP THERAPY DAILY TREATMENT
Outpatient Physical Therapy  LYMPHEDEMA THERAPY DAILY TREATMENT     [facility] Therapy [address].  [address]  [place] [address]  Phone:  [phone]  Fax:  [phone]    Date: 01/31/2023    Patient: Charity Zarate  YOB: 1945  MRN: [id number]     Time Calculation    Start time: 1331  Stop time: 1420 Time Calculation (min): 49 minutes         Chief Complaint: Lymphedema Breast Cancer    Visit #: 7    Subjective:   History of Present Illness:     Mechanism of injury:  Pt reports she has been using her pump more often and has continued to wrap R UE.     Lymphedema Objective    Right Upper Extremity Circumferential Measurements  Other: cm  Areola: cm  Breast Crease: cm  Axilla: 42.3 cm  Upper Proximal Humerus: 38.2 cm  Distal Humerus: 33.4 cm  Elbow: 29.2 cm  Mid-Forearm: 29 cm  Wrist: 16.5 cm  Distal Briggs Crease: 17.3 cm  Total: cm 205.9        Right Upper Extremity Circumferential Measurements 1/24  Other: cm  Areola: cm  Breast Crease: cm  Axilla: 41.6 cm  Upper Proximal Humerus: 38.4 cm  Distal Humerus: 34.8 cm  Elbow: 31.7 cm  Mid-Forearm: 30.8 cm  Wrist: 17.4 cm  Distal Briggs Crease: 18.1 cm  Total: cm 212.8          Right Upper Extremity Circumferential Measurements 1/17  Axilla: 42.3 cm  Upper Proximal Humerus: 39.9 cm  Distal Humerus: 35.6 cm  Elbow: 30.2 cm  Mid-Forearm: 30.2 cm  Wrist: 16.7 cm  Distal Briggs Crease: 18 cm  Total: cm 212.9          Right Upper Extremity Circumferential Measurements 1/12  Other: cm  Areola: cm  Breast Crease: cm  Axilla: 40.9 cm  Upper Proximal Humerus: 39.3 cm  Distal Humerus: 34 cm  Elbow: 29.8 cm  Mid-Forearm: 29.6 cm  Wrist: 17.1 cm  Distal Briggs Crease: 17.8 cm  Total: cm 208.5          Right Upper Extremity Circumferential Measurements 12/20  Other: cm  Areola: cm  Breast Crease: cm  Axilla: 43.1 cm  Upper Proximal Humerus: 41.2 cm  Distal Humerus: 33.5 cm  Elbow: 27.7 cm  Mid-Forearm: 27 cm  Wrist: 15.8 cm  Distal Briggs  Crease: 17.2 cm  Total: cm 205.5          Right Upper Extremity Circumferential Measurements 12/15  Other: cm  Areola: cm  Breast Crease: cm  Axilla: 40.1 cm  Upper Proximal Humerus: 38.8 cm  Distal Humerus: 33.5 cm  Elbow: 30.4 cm  Mid-Forearm: 29.4 cm  Wrist: 16.3 cm  Distal Briggs Crease: 17.2 cm  Total: cm 205.7          Left Upper Extremity Circumferential Measurements 12/13 EVAL  Other: cm  Areola: cm  Breast Crease: cm  Axilla: WILL NEED TO BE MEASURED AND ENTERED  Upper Proximal Humerus: 37.1 cm  Distal Humerus: 33.2 cm  Elbow: 27.3 cm  Mid-Forearm: 25.4 cm  Wrist: 15.9 cm  Distal Briggs Crease: 17.9 cm  Total: 156.8 cm THIS IS WITHOUT AXILLA     Right Upper Extremity Circumferential Measurements 12/13 EVAL  Other: cm  Areola: cm  Breast Crease: cm  Axilla: 41.2 cm  Upper Proximal Humerus: 39.4 cm  Distal Humerus: 35.4 cm  Elbow: 32.1 cm  Mid-Forearm: 31.5 cm  Wrist: 17.6 cm  Distal Briggs Crease: 18.4 cm  Total: cm 215.6    Therapeutic Treatments and Modalities:     1. Self Care ADL Training (CPT 57197), Discussed transition to Velcro. At this time, it is likely the best option given that her R UE may still fluctuate based on her cancer activity. Pt in agreement. Provided brand/sizing information.    2. Manual Therapy (CPT 29880)    Therapeutic Treatment and Modalities Summary: MLD to R UE with trunk involvement. Utilized AIA and GABRIELA only. The purpose of Manual Lymph Drainage (MLD) is to reduce lymph volume in the affected limb by increasing intake of lymphatic load into the lymphatic system, increasing the volume of transported lymph fluid, moving lymph fluid in superficial lymph vessels to collateral lymph collectors, anastomoses, or tissue channels, and increasing venous return. The goal of MLD is to re-route the lymph flow around blocked areas into more centrally located healthy lymph vessels, which drain into the venous system.      Re-applied multilayer compression to limit fluid accumulation to R UE.    Time-based treatments/modalities:    Physical Therapy Timed Treatment Charges  Functional training, self care minutes (CPT 66816): 15 minutes  Manual therapy minutes (CPT 86603): 34 minutes      Assessment and Plan:   Assessment details:  Charity has reduced her arm back down to ~205cm. This has been the lowest circumference she has achieved and it does not seem to be able to reduce further at this time. Discussed transition to [facility] for long-term maintenance.     Plan:  Therapy options:  Physical therapy treatment to continue  Discussed with:  Patient

## 2023-02-02 ENCOUNTER — APPOINTMENT (OUTPATIENT)
Dept: PHYSICAL THERAPY | Facility: REHABILITATION | Age: 78
End: 2023-02-02
Attending: SURGERY
Payer: MEDICARE

## 2023-02-06 ENCOUNTER — PHYSICAL THERAPY (OUTPATIENT)
Dept: PHYSICAL THERAPY | Facility: REHABILITATION | Age: 78
End: 2023-02-06
Attending: SURGERY
Payer: MEDICARE

## 2023-02-06 DIAGNOSIS — I89.0 LYMPHEDEMA: ICD-10-CM

## 2023-02-06 PROCEDURE — 97140 MANUAL THERAPY 1/> REGIONS: CPT

## 2023-02-06 NOTE — OP THERAPY DAILY TREATMENT
Outpatient Physical Therapy  LYMPHEDEMA THERAPY DAILY TREATMENT     [facility] Therapy [address].  [address]  [place] [address]  Phone:  [phone]  Fax:  [phone]    Date: 02/06/2023    Patient: Charity Zarate  YOB: 1945  MRN: [id number]     Time Calculation    Start time: 1546  Stop time: 1632 Time Calculation (min): 46 minutes         Chief Complaint: Lymphedema Breast Cancer    Visit #: 8    Subjective:   History of Present Illness:     Mechanism of injury:  Arm is achy today. Showered/moisturized prior to visit.     Lymphedema Objective    Right Upper Extremity Circumferential Measurements  Other: cm  Areola: cm  Breast Crease: cm  Axilla: 43 cm  Upper Proximal Humerus: 39.1 cm  Distal Humerus: 33.1 cm  Elbow: 31 cm  Mid-Forearm: 30.2 cm  Wrist: 16.9 cm  Distal Briggs Crease: 18.4 cm  Total: cm 211.7        Right Upper Extremity Circumferential Measurements 1/31  Other: cm  Areola: cm  Breast Crease: cm  Axilla: 42.3 cm  Upper Proximal Humerus: 38.2 cm  Distal Humerus: 33.4 cm  Elbow: 29.2 cm  Mid-Forearm: 29 cm  Wrist: 16.5 cm  Distal Briggs Crease: 17.3 cm  Total: cm 205.9          Right Upper Extremity Circumferential Measurements 1/24  Other: cm  Areola: cm  Breast Crease: cm  Axilla: 41.6 cm  Upper Proximal Humerus: 38.4 cm  Distal Humerus: 34.8 cm  Elbow: 31.7 cm  Mid-Forearm: 30.8 cm  Wrist: 17.4 cm  Distal Briggs Crease: 18.1 cm  Total: cm 212.8          Right Upper Extremity Circumferential Measurements 1/17  Axilla: 42.3 cm  Upper Proximal Humerus: 39.9 cm  Distal Humerus: 35.6 cm  Elbow: 30.2 cm  Mid-Forearm: 30.2 cm  Wrist: 16.7 cm  Distal Briggs Crease: 18 cm  Total: cm 212.9          Right Upper Extremity Circumferential Measurements 1/12  Other: cm  Areola: cm  Breast Crease: cm  Axilla: 40.9 cm  Upper Proximal Humerus: 39.3 cm  Distal Humerus: 34 cm  Elbow: 29.8 cm  Mid-Forearm: 29.6 cm  Wrist: 17.1 cm  Distal Briggs Crease: 17.8 cm  Total: cm  208.5          Right Upper Extremity Circumferential Measurements 12/20  Other: cm  Areola: cm  Breast Crease: cm  Axilla: 43.1 cm  Upper Proximal Humerus: 41.2 cm  Distal Humerus: 33.5 cm  Elbow: 27.7 cm  Mid-Forearm: 27 cm  Wrist: 15.8 cm  Distal Briggs Crease: 17.2 cm  Total: cm 205.5          Right Upper Extremity Circumferential Measurements 12/15  Other: cm  Areola: cm  Breast Crease: cm  Axilla: 40.1 cm  Upper Proximal Humerus: 38.8 cm  Distal Humerus: 33.5 cm  Elbow: 30.4 cm  Mid-Forearm: 29.4 cm  Wrist: 16.3 cm  Distal Briggs Crease: 17.2 cm  Total: cm 205.7          Left Upper Extremity Circumferential Measurements 12/13 EVAL  Other: cm  Areola: cm  Breast Crease: cm  Axilla: WILL NEED TO BE MEASURED AND ENTERED  Upper Proximal Humerus: 37.1 cm  Distal Humerus: 33.2 cm  Elbow: 27.3 cm  Mid-Forearm: 25.4 cm  Wrist: 15.9 cm  Distal Briggs Crease: 17.9 cm  Total: 156.8 cm THIS IS WITHOUT AXILLA     Right Upper Extremity Circumferential Measurements 12/13 EVAL  Other: cm  Areola: cm  Breast Crease: cm  Axilla: 41.2 cm  Upper Proximal Humerus: 39.4 cm  Distal Humerus: 35.4 cm  Elbow: 32.1 cm  Mid-Forearm: 31.5 cm  Wrist: 17.6 cm  Distal Briggs Crease: 18.4 cm  Total: cm 215.6    Therapeutic Treatments and Modalities:     1. Manual Therapy (CPT 10372)    Therapeutic Treatment and Modalities Summary: MLD to R UE with trunk involvement. Utilized AIA and GABRIELA only. The purpose of Manual Lymph Drainage (MLD) is to reduce lymph volume in the affected limb by increasing intake of lymphatic load into the lymphatic system, increasing the volume of transported lymph fluid, moving lymph fluid in superficial lymph vessels to collateral lymph collectors, anastomoses, or tissue channels, and increasing venous return. The goal of MLD is to re-route the lymph flow around blocked areas into more centrally located healthy lymph vessels, which drain into the venous system.      Re-applied multilayer compression to limit fluid  accumulation to R UE.   Time-based treatments/modalities:    Physical Therapy Timed Treatment Charges  Manual therapy minutes (CPT 89111): 46 minutes      Assessment and Plan:   Assessment details:  Charity has suffered an increase to her R UE. Suspect routes of lymph drainage are not as efficient as previous sessions. She is awaiting treatment for her cancer still. Will follow to manage her R UE and prevent further fluid accumulation.    Plan:  Therapy options:  Physical therapy treatment to continue  Discussed with:  Patient

## 2023-02-09 ENCOUNTER — APPOINTMENT (OUTPATIENT)
Dept: PHYSICAL THERAPY | Facility: REHABILITATION | Age: 78
End: 2023-02-09
Attending: SURGERY
Payer: MEDICARE

## 2023-02-14 ENCOUNTER — PHYSICAL THERAPY (OUTPATIENT)
Dept: PHYSICAL THERAPY | Facility: REHABILITATION | Age: 78
End: 2023-02-14
Attending: SURGERY
Payer: MEDICARE

## 2023-02-14 DIAGNOSIS — I89.0 LYMPHEDEMA: ICD-10-CM

## 2023-02-14 PROCEDURE — 97140 MANUAL THERAPY 1/> REGIONS: CPT

## 2023-02-14 NOTE — OP THERAPY PROGRESS SUMMARY
Outpatient Physical Therapy  PROGRESS SUMMARY NOTE      [facility] Physical Therapy [address].  [address]  [place] [address]  Phone:  [phone]  Fax:  [phone]    Date of Visit: 02/14/2023    Patient: Charity Zarate  YOB: 1945  MRN: [id number]     Referring Provider: Brian Crow M.D.  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 Lymphedema  I89.0       Rehab Potential: fair    Progress Report Period: 1/17/23-2/14/23    Functional Assessment Used          Objective Findings and Assessment:   Patient progression towards goals: Pt has been seen for four additional visits since her last progress report. She has sustained an increase to her R UE, especially proximally. It is likely her disease progression is influencing availability of adequate drainage channels despite MLD and compression. As she begins her chemo treatment soon, she will still benefit from intervention to reduce R UE and improve her ability to reach overhead.     Objective findings and assessment details: Right Upper Extremity Circumferential Measurements 2/14  Axilla: 44.6 cm  Upper Proximal Humerus: 41.1 cm  Distal Humerus: 34.3 cm  Elbow: 31.9 cm  Mid-Forearm: 30.3 cm  Wrist: 17.2 cm  Distal Briggs Crease: 18.2 cm  Total: cm 217.6      Right Upper Extremity Circumferential Measurements 1/17  Axilla: 42.3 cm  Upper Proximal Humerus: 39.9 cm  Distal Humerus: 35.6 cm  Elbow: 30.2 cm  Mid-Forearm: 30.2 cm  Wrist: 16.7 cm  Distal Briggs Crease: 18 cm  Total: cm 212.9    Left Upper Extremity Circumferential Measurements 12/13 EVAL  Axilla: NO VALUE  Upper Proximal Humerus: 37.1 cm  Distal Humerus: 33.2 cm  Elbow: 27.3 cm  Mid-Forearm: 25.4 cm  Wrist: 15.9 cm  Distal Briggs Crease: 17.9 cm  Total:  156.8 cm THIS IS WITHOUT AXILLA     Right Upper Extremity Circumferential Measurements 12/13 EVAL  Axilla: 41.2 cm  Upper Proximal Humerus: 39.4 cm  Distal Humerus: 35.4 cm  Elbow: 32.1 cm  Mid-Forearm: 31.5 cm  Wrist: 17.6 cm  Distal Briggs Crease: 18.4 cm  Total: cm 215.6      Goals:   Short Term Goals:   1. Pt will achieve a total limb circumference reduction of 10cm in order to decrease risk for infection and progression of disease process. Goal partially met  2. Pt will be independent with self-MLD to facilitate fluid migration to healthy tissues and lymph nodes. Goal Met  3. Pt will consistently perform exercises with bandages on to facilitate muscle pump of fluid from affected extremity. Goal Met  Short term goal time span:  2-4 weeks      Long Term Goals:     1. Pt will have a reduction in total limb circumference of 20cm in order to decrease risk for infection and progression of disease process. Goal Not Met  2. Patient will be independent with maintenance phase management of lymphedema including: compression garments, skin care education, risk reduction strategies, manual lymphatic drainage, and therapeutic exercise. Goal Not Met  Long term goal time span:  4-6 weeks    Plan:   Planned therapy interventions:  Manual Therapy (CPT 54495), Functional Training, Self Care (CPT 41966), Therapeutic Exercise (CPT 93026) and Therapeutic Activities (CPT 07290)  Frequency:  1x week  Duration in weeks:  8    Referring provider co-signature:  I have reviewed this plan of care and my co-signature certifies the need for services.     Certification Period: 02/14/2023 to 04/11/23    Physician Signature: ________________________________ Date: ______________

## 2023-02-14 NOTE — OP THERAPY DAILY TREATMENT
Outpatient Physical Therapy  LYMPHEDEMA THERAPY DAILY TREATMENT     [facility] Physical Therapy [address]  [address]  [place] [address]  Phone:  [phone]  Fax:  [phone]    Date: 02/14/2023    Patient: Charity Zarate  YOB: 1945  MRN: [id number]     Time Calculation    Start time: 1430  Stop time: 1459 Time Calculation (min): 29 minutes         Chief Complaint: Lymphedema Breast Cancer    Visit #: 9    Subjective:   History of Present Illness:     Mechanism of injury:  Is having more pain to supraclavicular fossa and some electrical sensations down her R arm.     Lymphedema Objective    Right Upper Extremity Circumferential Measurements  Other: cm  Areola: cm  Breast Crease: cm  Axilla: 44.6 cm  Upper Proximal Humerus: 41.1 cm  Distal Humerus: 34.3 cm  Elbow: 31.9 cm  Mid-Forearm: 30.3 cm  Wrist: 17.2 cm  Distal Briggs Crease: 18.2 cm  Total: cm 217.6        Right Upper Extremity Circumferential Measurements 2/6  Other: cm  Areola: cm  Breast Crease: cm  Axilla: 43 cm  Upper Proximal Humerus: 39.1 cm  Distal Humerus: 33.1 cm  Elbow: 31 cm  Mid-Forearm: 30.2 cm  Wrist: 16.9 cm  Distal Briggs Crease: 18.4 cm  Total: cm 211.7          Right Upper Extremity Circumferential Measurements 1/31  Other: cm  Areola: cm  Breast Crease: cm  Axilla: 42.3 cm  Upper Proximal Humerus: 38.2 cm  Distal Humerus: 33.4 cm  Elbow: 29.2 cm  Mid-Forearm: 29 cm  Wrist: 16.5 cm  Distal Briggs Crease: 17.3 cm  Total: cm 205.9          Right Upper Extremity Circumferential Measurements 1/24  Other: cm  Areola: cm  Breast Crease: cm  Axilla: 41.6 cm  Upper Proximal Humerus: 38.4 cm  Distal Humerus: 34.8 cm  Elbow: 31.7 cm  Mid-Forearm: 30.8 cm  Wrist: 17.4 cm  Distal Briggs Crease: 18.1 cm  Total: cm 212.8          Right Upper Extremity Circumferential Measurements 1/17  Axilla: 42.3 cm  Upper Proximal Humerus: 39.9 cm  Distal Humerus: 35.6 cm  Elbow: 30.2 cm  Mid-Forearm: 30.2 cm  Wrist: 16.7 cm  Distal  Briggs Crease: 18 cm  Total: cm 212.9          Right Upper Extremity Circumferential Measurements 1/12  Other: cm  Areola: cm  Breast Crease: cm  Axilla: 40.9 cm  Upper Proximal Humerus: 39.3 cm  Distal Humerus: 34 cm  Elbow: 29.8 cm  Mid-Forearm: 29.6 cm  Wrist: 17.1 cm  Distal Briggs Crease: 17.8 cm  Total: cm 208.5          Right Upper Extremity Circumferential Measurements 12/20  Other: cm  Areola: cm  Breast Crease: cm  Axilla: 43.1 cm  Upper Proximal Humerus: 41.2 cm  Distal Humerus: 33.5 cm  Elbow: 27.7 cm  Mid-Forearm: 27 cm  Wrist: 15.8 cm  Distal Briggs Crease: 17.2 cm  Total: cm 205.5          Right Upper Extremity Circumferential Measurements 12/15  Other: cm  Areola: cm  Breast Crease: cm  Axilla: 40.1 cm  Upper Proximal Humerus: 38.8 cm  Distal Humerus: 33.5 cm  Elbow: 30.4 cm  Mid-Forearm: 29.4 cm  Wrist: 16.3 cm  Distal Briggs Crease: 17.2 cm  Total: cm 205.7          Left Upper Extremity Circumferential Measurements 12/13 EVAL  Other: cm  Areola: cm  Breast Crease: cm  Axilla: WILL NEED TO BE MEASURED AND ENTERED  Upper Proximal Humerus: 37.1 cm  Distal Humerus: 33.2 cm  Elbow: 27.3 cm  Mid-Forearm: 25.4 cm  Wrist: 15.9 cm  Distal Briggs Crease: 17.9 cm  Total: 156.8 cm THIS IS WITHOUT AXILLA     Right Upper Extremity Circumferential Measurements 12/13 EVAL  Other: cm  Areola: cm  Breast Crease: cm  Axilla: 41.2 cm  Upper Proximal Humerus: 39.4 cm  Distal Humerus: 35.4 cm  Elbow: 32.1 cm  Mid-Forearm: 31.5 cm  Wrist: 17.6 cm  Distal Briggs Crease: 18.4 cm  Total: cm 215.6    Therapeutic Treatments and Modalities:     1. Manual Therapy (CPT 60102)    Therapeutic Treatment and Modalities Summary: MLD to R UE with trunk involvement. Focus on R UE and accompanying anastomoses. Avoided AAA. Focused on GABRIELA and AIA.  The purpose of Manual Lymph Drainage (MLD) is to reduce lymph volume in the affected limb by increasing intake of lymphatic load into the lymphatic system, increasing the volume of transported  lymph fluid, moving lymph fluid in superficial lymph vessels to collateral lymph collectors, anastomoses, or tissue channels, and increasing venous return. The goal of MLD is to re-route the lymph flow around blocked areas into more centrally located healthy lymph vessels, which drain into the venous system.    Time-based treatments/modalities:    Physical Therapy Timed Treatment Charges  Manual therapy minutes (CPT 27874): 29 minutes      Assessment and Plan:   Assessment details:  Charity has sustained a 7cm increase to her R UE. Most of the increase appears to be proximally. Suspect progression of her disease to be influencing fluid removal from her arm. She does begin her chemo treatment at the end of this week and will continue to benefit from intervention to reduce R UE to improve functionality.     Plan:  Therapy options:  Physical therapy treatment to continue  Discussed with:  Patient

## 2023-02-16 ENCOUNTER — APPOINTMENT (OUTPATIENT)
Dept: PHYSICAL THERAPY | Facility: REHABILITATION | Age: 78
End: 2023-02-16
Attending: SURGERY
Payer: MEDICARE

## 2023-02-21 ENCOUNTER — PHYSICAL THERAPY (OUTPATIENT)
Dept: PHYSICAL THERAPY | Facility: REHABILITATION | Age: 78
End: 2023-02-21
Attending: SURGERY
Payer: MEDICARE

## 2023-02-21 DIAGNOSIS — I89.0 LYMPHEDEMA: ICD-10-CM

## 2023-02-21 PROCEDURE — 97140 MANUAL THERAPY 1/> REGIONS: CPT

## 2023-02-21 NOTE — OP THERAPY DAILY TREATMENT
Outpatient Physical Therapy  LYMPHEDEMA THERAPY DAILY TREATMENT     [facility] Physical Therapy [address].  [address]  [place] [address]  Phone:  [phone]  Fax:  [phone]    Date: 02/21/2023    Patient: Charity Zarate  YOB: 1945  MRN: [id number]     Time Calculation    Start time: 1416  Stop time: 1455 Time Calculation (min): 39 minutes         Chief Complaint: Lymphedema Breast Cancer    Visit #: 10    Subjective:   History of Present Illness:     Mechanism of injury:  Pt having a challenging day. The past three days have been painful and fatiguing. To the point where she feels nauseated.     Lymphedema Objective    Right Upper Extremity Circumferential Measurements  Other: cm  Areola: cm  Breast Crease: cm  Axilla: 44.5 cm  Upper Proximal Humerus: 40.7 cm  Distal Humerus: 35.6 cm  Elbow: 30.8 cm  Mid-Forearm: 30.8 cm  Wrist: 18.3 cm  Distal Briggs Crease: 19.3 cm  Total: cm 220        Right Upper Extremity Circumferential Measurements 2/14  Other: cm  Areola: cm  Breast Crease: cm  Axilla: 44.6 cm  Upper Proximal Humerus: 41.1 cm  Distal Humerus: 34.3 cm  Elbow: 31.9 cm  Mid-Forearm: 30.3 cm  Wrist: 17.2 cm  Distal Briggs Crease: 18.2 cm  Total: cm 217.6          Right Upper Extremity Circumferential Measurements 2/6  Other: cm  Areola: cm  Breast Crease: cm  Axilla: 43 cm  Upper Proximal Humerus: 39.1 cm  Distal Humerus: 33.1 cm  Elbow: 31 cm  Mid-Forearm: 30.2 cm  Wrist: 16.9 cm  Distal Briggs Crease: 18.4 cm  Total: cm 211.7          Right Upper Extremity Circumferential Measurements 1/31  Other: cm  Areola: cm  Breast Crease: cm  Axilla: 42.3 cm  Upper Proximal Humerus: 38.2 cm  Distal Humerus: 33.4 cm  Elbow: 29.2 cm  Mid-Forearm: 29 cm  Wrist: 16.5 cm  Distal Briggs Crease: 17.3 cm  Total: cm 205.9          Right Upper Extremity Circumferential Measurements 1/24  Other: cm  Areola: cm  Breast Crease: cm  Axilla: 41.6 cm  Upper Proximal Humerus: 38.4 cm  Distal  Humerus: 34.8 cm  Elbow: 31.7 cm  Mid-Forearm: 30.8 cm  Wrist: 17.4 cm  Distal Briggs Crease: 18.1 cm  Total: cm 212.8          Right Upper Extremity Circumferential Measurements 1/17  Axilla: 42.3 cm  Upper Proximal Humerus: 39.9 cm  Distal Humerus: 35.6 cm  Elbow: 30.2 cm  Mid-Forearm: 30.2 cm  Wrist: 16.7 cm  Distal Briggs Crease: 18 cm  Total: cm 212.9          Right Upper Extremity Circumferential Measurements 1/12  Other: cm  Areola: cm  Breast Crease: cm  Axilla: 40.9 cm  Upper Proximal Humerus: 39.3 cm  Distal Humerus: 34 cm  Elbow: 29.8 cm  Mid-Forearm: 29.6 cm  Wrist: 17.1 cm  Distal Briggs Crease: 17.8 cm  Total: cm 208.5          Right Upper Extremity Circumferential Measurements 12/20  Other: cm  Areola: cm  Breast Crease: cm  Axilla: 43.1 cm  Upper Proximal Humerus: 41.2 cm  Distal Humerus: 33.5 cm  Elbow: 27.7 cm  Mid-Forearm: 27 cm  Wrist: 15.8 cm  Distal Briggs Crease: 17.2 cm  Total: cm 205.5          Right Upper Extremity Circumferential Measurements 12/15  Other: cm  Areola: cm  Breast Crease: cm  Axilla: 40.1 cm  Upper Proximal Humerus: 38.8 cm  Distal Humerus: 33.5 cm  Elbow: 30.4 cm  Mid-Forearm: 29.4 cm  Wrist: 16.3 cm  Distal Briggs Crease: 17.2 cm  Total: cm 205.7          Left Upper Extremity Circumferential Measurements 12/13 EVAL  Other: cm  Areola: cm  Breast Crease: cm  Axilla: WILL NEED TO BE MEASURED AND ENTERED  Upper Proximal Humerus: 37.1 cm  Distal Humerus: 33.2 cm  Elbow: 27.3 cm  Mid-Forearm: 25.4 cm  Wrist: 15.9 cm  Distal Briggs Crease: 17.9 cm  Total: 156.8 cm THIS IS WITHOUT AXILLA     Right Upper Extremity Circumferential Measurements 12/13 EVAL  Other: cm  Areola: cm  Breast Crease: cm  Axilla: 41.2 cm  Upper Proximal Humerus: 39.4 cm  Distal Humerus: 35.4 cm  Elbow: 32.1 cm  Mid-Forearm: 31.5 cm  Wrist: 17.6 cm  Distal Briggs Crease: 18.4 cm  Total: cm 215.6    Therapeutic Treatments and Modalities:     1. Manual Therapy (CPT 13197)    Therapeutic Treatment and  Modalities Summary: MLD to R UE with trunk involvement. Focus on R UE and accompanying anastomoses. Avoided AAA. Focused on GABRIELA and AIA.  The purpose of Manual Lymph Drainage (MLD) is to reduce lymph volume in the affected limb by increasing intake of lymphatic load into the lymphatic system, increasing the volume of transported lymph fluid, moving lymph fluid in superficial lymph vessels to collateral lymph collectors, anastomoses, or tissue channels, and increasing venous return. The goal of MLD is to re-route the lymph flow around blocked areas into more centrally located healthy lymph vessels, which drain into the venous system.    Time-based treatments/modalities:    Physical Therapy Timed Treatment Charges  Manual therapy minutes (CPT 95637): 39 minutes      Assessment and Plan:   Assessment details:  Charity is sustaining increases to her circumferences likely due to active cancer. However, she began her chemo treatment today and will hopefully see an improvement in her measurements soon. In the interim, she will benefit from intervention to allow her to move her arm functionally overhead without pain and through full ROM.     Plan:  Therapy options:  Physical therapy treatment to continue  Discussed with:  Patient

## 2023-02-23 ENCOUNTER — APPOINTMENT (OUTPATIENT)
Dept: PHYSICAL THERAPY | Facility: REHABILITATION | Age: 78
End: 2023-02-23
Attending: SURGERY
Payer: MEDICARE

## 2023-03-02 ENCOUNTER — PHYSICAL THERAPY (OUTPATIENT)
Dept: PHYSICAL THERAPY | Facility: REHABILITATION | Age: 78
End: 2023-03-02
Attending: SURGERY
Payer: MEDICARE

## 2023-03-02 DIAGNOSIS — I89.0 LYMPHEDEMA: ICD-10-CM

## 2023-03-02 PROCEDURE — 97140 MANUAL THERAPY 1/> REGIONS: CPT

## 2023-03-02 NOTE — OP THERAPY DAILY TREATMENT
"  Outpatient Physical Therapy  LYMPHEDEMA THERAPY DAILY TREATMENT     [facility] Physical Therapy [address]  [address]  [place] [address]  Phone:  [phone]  Fax:  [phone]    Date: 03/02/2023    Patient: Charity Zarate  YOB: 1945  MRN: [id number]     Time Calculation    Start time: 1415  Stop time: 1502 Time Calculation (min): 47 minutes         Chief Complaint: Lymphedema Breast Cancer    Visit #: 11    Subjective:   History of Present Illness:     Mechanism of injury:  Pt reporting she fell on the ice three days ago while trying to shovel her driveway. SInce then, her  RUE has been even more swollen. She reports her R UE was evaluated at urgent care and was told she \"strained\" something.     Lymphedema Objective    Right Upper Extremity Circumferential Measurements  Other: cm  Areola: cm  Breast Crease: cm  Axilla: 41.4 cm  Upper Proximal Humerus: 40.4 cm  Distal Humerus: 36.6 cm  Elbow: 33.5 cm  Mid-Forearm: 34.2 cm  Wrist: 18.7 cm  Distal Briggs Crease: 21.1 cm  Total: cm 225.9        Right Upper Extremity Circumferential Measurements  Other: cm  Areola: cm  Breast Crease: cm  Axilla: 44.5 cm  Upper Proximal Humerus: 40.7 cm  Distal Humerus: 35.6 cm  Elbow: 30.8 cm  Mid-Forearm: 30.8 cm  Wrist: 18.3 cm  Distal Briggs Crease: 19.3 cm  Total: cm 220          Right Upper Extremity Circumferential Measurements 2/14  Other: cm  Areola: cm  Breast Crease: cm  Axilla: 44.6 cm  Upper Proximal Humerus: 41.1 cm  Distal Humerus: 34.3 cm  Elbow: 31.9 cm  Mid-Forearm: 30.3 cm  Wrist: 17.2 cm  Distal Briggs Crease: 18.2 cm  Total: cm 217.6          Right Upper Extremity Circumferential Measurements 2/6  Other: cm  Areola: cm  Breast Crease: cm  Axilla: 43 cm  Upper Proximal Humerus: 39.1 cm  Distal Humerus: 33.1 cm  Elbow: 31 cm  Mid-Forearm: 30.2 cm  Wrist: 16.9 cm  Distal Briggs Crease: 18.4 cm  Total: cm 211.7          Right Upper Extremity Circumferential Measurements 1/31  Other: " cm  Areola: cm  Breast Crease: cm  Axilla: 42.3 cm  Upper Proximal Humerus: 38.2 cm  Distal Humerus: 33.4 cm  Elbow: 29.2 cm  Mid-Forearm: 29 cm  Wrist: 16.5 cm  Distal Briggs Crease: 17.3 cm  Total: cm 205.9          Right Upper Extremity Circumferential Measurements 1/24  Other: cm  Areola: cm  Breast Crease: cm  Axilla: 41.6 cm  Upper Proximal Humerus: 38.4 cm  Distal Humerus: 34.8 cm  Elbow: 31.7 cm  Mid-Forearm: 30.8 cm  Wrist: 17.4 cm  Distal Briggs Crease: 18.1 cm  Total: cm 212.8          Right Upper Extremity Circumferential Measurements 1/17  Axilla: 42.3 cm  Upper Proximal Humerus: 39.9 cm  Distal Humerus: 35.6 cm  Elbow: 30.2 cm  Mid-Forearm: 30.2 cm  Wrist: 16.7 cm  Distal Briggs Crease: 18 cm  Total: cm 212.9          Right Upper Extremity Circumferential Measurements 1/12  Other: cm  Areola: cm  Breast Crease: cm  Axilla: 40.9 cm  Upper Proximal Humerus: 39.3 cm  Distal Humerus: 34 cm  Elbow: 29.8 cm  Mid-Forearm: 29.6 cm  Wrist: 17.1 cm  Distal Briggs Crease: 17.8 cm  Total: cm 208.5          Right Upper Extremity Circumferential Measurements 12/20  Other: cm  Areola: cm  Breast Crease: cm  Axilla: 43.1 cm  Upper Proximal Humerus: 41.2 cm  Distal Humerus: 33.5 cm  Elbow: 27.7 cm  Mid-Forearm: 27 cm  Wrist: 15.8 cm  Distal Briggs Crease: 17.2 cm  Total: cm 205.5          Right Upper Extremity Circumferential Measurements 12/15  Other: cm  Areola: cm  Breast Crease: cm  Axilla: 40.1 cm  Upper Proximal Humerus: 38.8 cm  Distal Humerus: 33.5 cm  Elbow: 30.4 cm  Mid-Forearm: 29.4 cm  Wrist: 16.3 cm  Distal Briggs Crease: 17.2 cm  Total: cm 205.7          Left Upper Extremity Circumferential Measurements 12/13 EVAL  Other: cm  Areola: cm  Breast Crease: cm  Axilla: WILL NEED TO BE MEASURED AND ENTERED  Upper Proximal Humerus: 37.1 cm  Distal Humerus: 33.2 cm  Elbow: 27.3 cm  Mid-Forearm: 25.4 cm  Wrist: 15.9 cm  Distal Briggs Crease: 17.9 cm  Total: 156.8 cm THIS IS WITHOUT AXILLA     Right Upper Extremity  Circumferential Measurements 12/13 EVAL  Other: cm  Areola: cm  Breast Crease: cm  Axilla: 41.2 cm  Upper Proximal Humerus: 39.4 cm  Distal Humerus: 35.4 cm  Elbow: 32.1 cm  Mid-Forearm: 31.5 cm  Wrist: 17.6 cm  Distal Briggs Crease: 18.4 cm  Total: cm 215.6    Therapeutic Treatments and Modalities:     1. Manual Therapy (CPT 40655)    Therapeutic Treatment and Modalities Summary: MLD to R UE with trunk involvement. Focus on R UE and accompanying anastomoses. Fibrotic and edema techniques performed to distal humeral area s well as forearm.   The purpose of Manual Lymph Drainage (MLD) is to reduce lymph volume in the affected limb by increasing intake of lymphatic load into the lymphatic system, increasing the volume of transported lymph fluid, moving lymph fluid in superficial lymph vessels to collateral lymph collectors, anastomoses, or tissue channels, and increasing venous return. The goal of MLD is to re-route the lymph flow around blocked areas into more centrally located healthy lymph vessels, which drain into the venous system.    Time-based treatments/modalities:    Physical Therapy Timed Treatment Charges  Manual therapy minutes (CPT 70644): 47 minutes      Assessment and Plan:   Functional Impairments: swelling    Assessment details:  Charity has sustained, again, another increase to her R UE. This does appear to be in-part due to her recent fall on the ice. Discussed progress versus plateau. There is a possibility that due to her active cancer that she may sustain slight increases and then drop as her treatment takes effect. Will evaluate for progression next session.     Plan:  Therapy options:  Physical therapy treatment to continue  Discussed with:  Patient

## 2023-03-07 ENCOUNTER — PHYSICAL THERAPY (OUTPATIENT)
Dept: PHYSICAL THERAPY | Facility: REHABILITATION | Age: 78
End: 2023-03-07
Attending: SURGERY
Payer: MEDICARE

## 2023-03-07 DIAGNOSIS — I89.0 LYMPHEDEMA: ICD-10-CM

## 2023-03-07 PROCEDURE — 97140 MANUAL THERAPY 1/> REGIONS: CPT

## 2023-03-07 NOTE — OP THERAPY DAILY TREATMENT
Outpatient Physical Therapy  LYMPHEDEMA THERAPY DAILY TREATMENT     [facility] Physical Therapy [address].  [address]  [place] [address]  Phone:  [phone]  Fax:  [phone]    Date: 03/07/2023    Patient: Charity Zarate  YOB: 1945  MRN: [id number]     Time Calculation    Start time: 1415  Stop time: 1501 Time Calculation (min): 46 minutes         Chief Complaint: Lymphedema Breast Cancer    Visit #: 12    Subjective:   History of Present Illness:     Mechanism of injury:  Removed bandages this morning. Tired to re-wrap, but had difficulty.     Lymphedema Objective      Left Upper Extremity Circumferential Measurements  Other: cm  Areola: cm  Breast Crease: cm  Axilla: cm  Upper Proximal Humerus: cm  Distal Humerus: cm  Elbow: cm  Mid-Forearm: cm  Wrist: cm  Distal Briggs Crease: 0 cm  Total: 0 cm    Right Upper Extremity Circumferential Measurements  Other: cm  Areola: cm  Breast Crease: cm  Axilla: 41.3 cm  Upper Proximal Humerus: 43.2 cm  Distal Humerus: 37.5 cm  Elbow: 31.8 cm  Mid-Forearm: 33.4 cm  Wrist: 18.7 cm  Distal Briggs Crease: 21 cm  Total: cm 226.9        Right Upper Extremity Circumferential Measurements 3/2  Other: cm  Areola: cm  Breast Crease: cm  Axilla: 41.4 cm  Upper Proximal Humerus: 40.4 cm  Distal Humerus: 36.6 cm  Elbow: 33.5 cm  Mid-Forearm: 34.2 cm  Wrist: 18.7 cm  Distal Briggs Crease: 21.1 cm  Total: cm 225.9          Right Upper Extremity Circumferential Measurements 2/21  Other: cm  Areola: cm  Breast Crease: cm  Axilla: 44.5 cm  Upper Proximal Humerus: 40.7 cm  Distal Humerus: 35.6 cm  Elbow: 30.8 cm  Mid-Forearm: 30.8 cm  Wrist: 18.3 cm  Distal Briggs Crease: 19.3 cm  Total: cm 220          Right Upper Extremity Circumferential Measurements 2/14  Other: cm  Areola: cm  Breast Crease: cm  Axilla: 44.6 cm  Upper Proximal Humerus: 41.1 cm  Distal Humerus: 34.3 cm  Elbow: 31.9 cm  Mid-Forearm: 30.3 cm  Wrist: 17.2 cm  Distal Briggs Crease: 18.2  cm  Total: cm 217.6          Right Upper Extremity Circumferential Measurements 2/6  Other: cm  Areola: cm  Breast Crease: cm  Axilla: 43 cm  Upper Proximal Humerus: 39.1 cm  Distal Humerus: 33.1 cm  Elbow: 31 cm  Mid-Forearm: 30.2 cm  Wrist: 16.9 cm  Distal Briggs Crease: 18.4 cm  Total: cm 211.7          Right Upper Extremity Circumferential Measurements 1/31  Other: cm  Areola: cm  Breast Crease: cm  Axilla: 42.3 cm  Upper Proximal Humerus: 38.2 cm  Distal Humerus: 33.4 cm  Elbow: 29.2 cm  Mid-Forearm: 29 cm  Wrist: 16.5 cm  Distal Briggs Crease: 17.3 cm  Total: cm 205.9          Right Upper Extremity Circumferential Measurements 1/24  Other: cm  Areola: cm  Breast Crease: cm  Axilla: 41.6 cm  Upper Proximal Humerus: 38.4 cm  Distal Humerus: 34.8 cm  Elbow: 31.7 cm  Mid-Forearm: 30.8 cm  Wrist: 17.4 cm  Distal Briggs Crease: 18.1 cm  Total: cm 212.8          Right Upper Extremity Circumferential Measurements 1/17  Axilla: 42.3 cm  Upper Proximal Humerus: 39.9 cm  Distal Humerus: 35.6 cm  Elbow: 30.2 cm  Mid-Forearm: 30.2 cm  Wrist: 16.7 cm  Distal Briggs Crease: 18 cm  Total: cm 212.9          Right Upper Extremity Circumferential Measurements 1/12  Other: cm  Areola: cm  Breast Crease: cm  Axilla: 40.9 cm  Upper Proximal Humerus: 39.3 cm  Distal Humerus: 34 cm  Elbow: 29.8 cm  Mid-Forearm: 29.6 cm  Wrist: 17.1 cm  Distal Briggs Crease: 17.8 cm  Total: cm 208.5          Right Upper Extremity Circumferential Measurements 12/20  Other: cm  Areola: cm  Breast Crease: cm  Axilla: 43.1 cm  Upper Proximal Humerus: 41.2 cm  Distal Humerus: 33.5 cm  Elbow: 27.7 cm  Mid-Forearm: 27 cm  Wrist: 15.8 cm  Distal Briggs Crease: 17.2 cm  Total: cm 205.5          Right Upper Extremity Circumferential Measurements 12/15  Other: cm  Areola: cm  Breast Crease: cm  Axilla: 40.1 cm  Upper Proximal Humerus: 38.8 cm  Distal Humerus: 33.5 cm  Elbow: 30.4 cm  Mid-Forearm: 29.4 cm  Wrist: 16.3 cm  Distal Briggs Crease: 17.2 cm  Total: cm  205.7          Left Upper Extremity Circumferential Measurements 12/13 EVAL  Other: cm  Areola: cm  Breast Crease: cm  Axilla: WILL NEED TO BE MEASURED AND ENTERED  Upper Proximal Humerus: 37.1 cm  Distal Humerus: 33.2 cm  Elbow: 27.3 cm  Mid-Forearm: 25.4 cm  Wrist: 15.9 cm  Distal Briggs Crease: 17.9 cm  Total: 156.8 cm THIS IS WITHOUT AXILLA     Right Upper Extremity Circumferential Measurements 12/13 EVAL  Other: cm  Areola: cm  Breast Crease: cm  Axilla: 41.2 cm  Upper Proximal Humerus: 39.4 cm  Distal Humerus: 35.4 cm  Elbow: 32.1 cm  Mid-Forearm: 31.5 cm  Wrist: 17.6 cm  Distal Briggs Crease: 18.4 cm  Total: cm 215.6       Therapeutic Treatments and Modalities:     1. Manual Therapy (CPT 04938)    Therapeutic Treatment and Modalities Summary: MLD to R UE with trunk involvement. Focus on R UE and accompanying anastomoses. Fibrotic and edema techniques performed to distal humeral area as well as forearm.   The purpose of Manual Lymph Drainage (MLD) is to reduce lymph volume in the affected limb by increasing intake of lymphatic load into the lymphatic system, increasing the volume of transported lymph fluid, moving lymph fluid in superficial lymph vessels to collateral lymph collectors, anastomoses, or tissue channels, and increasing venous return. The goal of MLD is to re-route the lymph flow around blocked areas into more centrally located healthy lymph vessels, which drain into the venous system.    Time-based treatments/modalities:    Physical Therapy Timed Treatment Charges  Manual therapy minutes (CPT 87575): 46 minutes      Assessment and Plan:   Assessment details:  Charity has sustained another slight increase. Discussed obtaining a Velcro garment to at least allow her to control the arm with increased ease. Do suspect her increases to be related to active cancer.    Plan:  Therapy options:  Physical therapy treatment to continue  Discussed with:  Patient

## 2023-03-13 ENCOUNTER — PHYSICAL THERAPY (OUTPATIENT)
Dept: PHYSICAL THERAPY | Facility: REHABILITATION | Age: 78
End: 2023-03-13
Attending: SURGERY
Payer: MEDICARE

## 2023-03-13 DIAGNOSIS — I89.0 LYMPHEDEMA: ICD-10-CM

## 2023-03-13 PROCEDURE — 97140 MANUAL THERAPY 1/> REGIONS: CPT

## 2023-03-13 PROCEDURE — 97535 SELF CARE MNGMENT TRAINING: CPT

## 2023-03-13 NOTE — OP THERAPY DISCHARGE SUMMARY
Outpatient Physical Therapy  DISCHARGE SUMMARY NOTE      [facility] Physical Therapy [place] [address]  [address]  [place] [address]  Phone:  [phone]  Fax:  [phone]    Date of Visit: 03/13/2023    Patient: Chairty Zarate  YOB: 1945  MRN: [id number]     Referring Provider: Brian Crow M.D.  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 Initially, she saw circumferential reduction as well as improvement to her pain and R GHJ ROM. However, over the past few treatments, Charity's R UE has begun expanding beyond control. Her body is more painful and she has new, palpable masses over chest with lymphadenopathy at her inguinal nodes. Discussed that her active cancer has limited further progression at this time; pt has verbalized understanding. Will DC.     Thank you for the referral,    Vickie Ward, PT, DPT, CLT    Date: 3/13/2023

## 2023-03-13 NOTE — OP THERAPY DAILY TREATMENT
Outpatient Physical Therapy  LYMPHEDEMA THERAPY DAILY TREATMENT     [facility] [address]  [address]  [place] [address]  Phone:  [phone]  Fax:  [phone]    Date: 03/13/2023    Patient: Charity Zarate  YOB: 1945  MRN: [id number]     Time Calculation    Start time: 1435  Stop time: 1530 Time Calculation (min): 55 minutes         Chief Complaint: Lymphedema Breast Cancer    Visit #: 13    Subjective:   History of Present Illness:     Mechanism of injury:  Arm worse. Overall, body painful, especially R neck. Legs have been painful at night. Wakes her up.     Lymphedema Objective    Right Upper Extremity Circumferential Measurements  Other: cm  Areola: cm  Breast Crease: cm  Axilla: 43.3 cm  Upper Proximal Humerus: 42.7 cm  Distal Humerus: 38.1 cm  Elbow: 35.3 cm  Mid-Forearm: 35.2 cm  Wrist: 19.2 cm  Distal Briggs Crease: 21.4 cm  Total: cm 235.2             Right Upper Extremity Circumferential Measurements 3/7  Other: cm  Areola: cm  Breast Crease: cm  Axilla: 41.3 cm  Upper Proximal Humerus: 43.2 cm  Distal Humerus: 37.5 cm  Elbow: 31.8 cm  Mid-Forearm: 33.4 cm  Wrist: 18.7 cm  Distal Briggs Crease: 21 cm  Total: cm 226.9          Right Upper Extremity Circumferential Measurements 3/2  Other: cm  Areola: cm  Breast Crease: cm  Axilla: 41.4 cm  Upper Proximal Humerus: 40.4 cm  Distal Humerus: 36.6 cm  Elbow: 33.5 cm  Mid-Forearm: 34.2 cm  Wrist: 18.7 cm  Distal Briggs Crease: 21.1 cm  Total: cm 225.9          Right Upper Extremity Circumferential Measurements 2/21  Other: cm  Areola: cm  Breast Crease: cm  Axilla: 44.5 cm  Upper Proximal Humerus: 40.7 cm  Distal Humerus: 35.6 cm  Elbow: 30.8 cm  Mid-Forearm: 30.8 cm  Wrist: 18.3 cm  Distal Briggs Crease: 19.3 cm  Total: cm 220          Right Upper Extremity Circumferential Measurements 2/14  Other: cm  Areola: cm  Breast Crease: cm  Axilla: 44.6 cm  Upper Proximal Humerus: 41.1 cm  Distal Humerus: 34.3  cm  Elbow: 31.9 cm  Mid-Forearm: 30.3 cm  Wrist: 17.2 cm  Distal Briggs Crease: 18.2 cm  Total: cm 217.6          Right Upper Extremity Circumferential Measurements 2/6  Other: cm  Areola: cm  Breast Crease: cm  Axilla: 43 cm  Upper Proximal Humerus: 39.1 cm  Distal Humerus: 33.1 cm  Elbow: 31 cm  Mid-Forearm: 30.2 cm  Wrist: 16.9 cm  Distal Briggs Crease: 18.4 cm  Total: cm 211.7          Right Upper Extremity Circumferential Measurements 1/31  Other: cm  Areola: cm  Breast Crease: cm  Axilla: 42.3 cm  Upper Proximal Humerus: 38.2 cm  Distal Humerus: 33.4 cm  Elbow: 29.2 cm  Mid-Forearm: 29 cm  Wrist: 16.5 cm  Distal Briggs Crease: 17.3 cm  Total: cm 205.9          Right Upper Extremity Circumferential Measurements 1/24  Other: cm  Areola: cm  Breast Crease: cm  Axilla: 41.6 cm  Upper Proximal Humerus: 38.4 cm  Distal Humerus: 34.8 cm  Elbow: 31.7 cm  Mid-Forearm: 30.8 cm  Wrist: 17.4 cm  Distal Briggs Crease: 18.1 cm  Total: cm 212.8          Right Upper Extremity Circumferential Measurements 1/17  Axilla: 42.3 cm  Upper Proximal Humerus: 39.9 cm  Distal Humerus: 35.6 cm  Elbow: 30.2 cm  Mid-Forearm: 30.2 cm  Wrist: 16.7 cm  Distal Briggs Crease: 18 cm  Total: cm 212.9          Right Upper Extremity Circumferential Measurements 1/12  Other: cm  Areola: cm  Breast Crease: cm  Axilla: 40.9 cm  Upper Proximal Humerus: 39.3 cm  Distal Humerus: 34 cm  Elbow: 29.8 cm  Mid-Forearm: 29.6 cm  Wrist: 17.1 cm  Distal Briggs Crease: 17.8 cm  Total: cm 208.5          Right Upper Extremity Circumferential Measurements 12/20  Other: cm  Areola: cm  Breast Crease: cm  Axilla: 43.1 cm  Upper Proximal Humerus: 41.2 cm  Distal Humerus: 33.5 cm  Elbow: 27.7 cm  Mid-Forearm: 27 cm  Wrist: 15.8 cm  Distal Briggs Crease: 17.2 cm  Total: cm 205.5          Right Upper Extremity Circumferential Measurements 12/15  Other: cm  Areola: cm  Breast Crease: cm  Axilla: 40.1 cm  Upper Proximal Humerus: 38.8 cm  Distal Humerus: 33.5 cm  Elbow:  30.4 cm  Mid-Forearm: 29.4 cm  Wrist: 16.3 cm  Distal Briggs Crease: 17.2 cm  Total: cm 205.7          Left Upper Extremity Circumferential Measurements 12/13 EVAL  Other: cm  Areola: cm  Breast Crease: cm  Axilla: WILL NEED TO BE MEASURED AND ENTERED  Upper Proximal Humerus: 37.1 cm  Distal Humerus: 33.2 cm  Elbow: 27.3 cm  Mid-Forearm: 25.4 cm  Wrist: 15.9 cm  Distal Briggs Crease: 17.9 cm  Total: 156.8 cm THIS IS WITHOUT AXILLA     Right Upper Extremity Circumferential Measurements 12/13 EVAL  Other: cm  Areola: cm  Breast Crease: cm  Axilla: 41.2 cm  Upper Proximal Humerus: 39.4 cm  Distal Humerus: 35.4 cm  Elbow: 32.1 cm  Mid-Forearm: 31.5 cm  Wrist: 17.6 cm  Distal Briggs Crease: 18.4 cm  Total: cm 215.6       Therapeutic Treatments and Modalities:     1. Self Care ADL Training (CPT 46854), Long discussion regarding POC. Charity understands that at this point she will have to manage her R UE utiliing pump and Velcro. Advised pt to return should she note a reduction to her R UE in order to facilitate further reduction utilizing CDT.    2. Manual Therapy (CPT 29352)    Therapeutic Treatment and Modalities Summary: MLD to R UE with trunk involvement. Focus on R UE and accompanying anastomoses; avoided AAA   The purpose of Manual Lymph Drainage (MLD) is to reduce lymph volume in the affected limb by increasing intake of lymphatic load into the lymphatic system, increasing the volume of transported lymph fluid, moving lymph fluid in superficial lymph vessels to collateral lymph collectors, anastomoses, or tissue channels, and increasing venous return. The goal of MLD is to re-route the lymph flow around blocked areas into more centrally located healthy lymph vessels, which drain into the venous system.    Time-based treatments/modalities:    Physical Therapy Timed Treatment Charges  Functional training, self care minutes (CPT 96674): 25 minutes  Manual therapy minutes (CPT 62133): 30 minutes      Assessment and  Plan:   Assessment details:  Charity has sustained a 9cm increase to her R UE. At this point, she is now consistently increasing her circumferences, more than likely due to active cancer. Did discuss POC with her including transitioning to self management while she undergoes her PiqRay chemo. She understands to return if she sees improvement to arm with pump and Velcro. At this time, will DC due to lack of progress.     Plan:  Therapy options:  Discharged due to decline in medical status  Discussed with:  Patient

## 2023-03-14 ENCOUNTER — APPOINTMENT (OUTPATIENT)
Dept: PHYSICAL THERAPY | Facility: REHABILITATION | Age: 78
End: 2023-03-14
Attending: SURGERY
Payer: MEDICARE

## 2023-03-21 ENCOUNTER — APPOINTMENT (OUTPATIENT)
Dept: PHYSICAL THERAPY | Facility: REHABILITATION | Age: 78
End: 2023-03-21
Attending: SURGERY
Payer: MEDICARE

## 2023-04-04 ENCOUNTER — APPOINTMENT (OUTPATIENT)
Dept: PHYSICAL THERAPY | Facility: REHABILITATION | Age: 78
End: 2023-04-04
Attending: SURGERY
Payer: MEDICARE

## 2023-04-11 ENCOUNTER — APPOINTMENT (OUTPATIENT)
Dept: PHYSICAL THERAPY | Facility: REHABILITATION | Age: 78
End: 2023-04-11
Attending: SURGERY
Payer: MEDICARE

## 2023-04-18 ENCOUNTER — APPOINTMENT (OUTPATIENT)
Dept: PHYSICAL THERAPY | Facility: REHABILITATION | Age: 78
End: 2023-04-18
Attending: SURGERY
Payer: MEDICARE

## 2023-04-25 ENCOUNTER — APPOINTMENT (OUTPATIENT)
Dept: PHYSICAL THERAPY | Facility: REHABILITATION | Age: 78
End: 2023-04-25
Attending: SURGERY
Payer: MEDICARE

## 2023-05-02 ENCOUNTER — APPOINTMENT (OUTPATIENT)
Dept: PHYSICAL THERAPY | Facility: REHABILITATION | Age: 78
End: 2023-05-02
Attending: SURGERY
Payer: MEDICARE

## 2023-05-09 ENCOUNTER — APPOINTMENT (OUTPATIENT)
Dept: PHYSICAL THERAPY | Facility: REHABILITATION | Age: 78
End: 2023-05-09
Attending: SURGERY
Payer: MEDICARE

## 2023-05-16 ENCOUNTER — APPOINTMENT (OUTPATIENT)
Dept: PHYSICAL THERAPY | Facility: REHABILITATION | Age: 78
End: 2023-05-16
Attending: SURGERY
Payer: MEDICARE

## 2023-05-23 ENCOUNTER — APPOINTMENT (OUTPATIENT)
Dept: PHYSICAL THERAPY | Facility: REHABILITATION | Age: 78
End: 2023-05-23
Attending: SURGERY
Payer: MEDICARE

## 2023-05-30 ENCOUNTER — APPOINTMENT (OUTPATIENT)
Dept: PHYSICAL THERAPY | Facility: REHABILITATION | Age: 78
End: 2023-05-30
Attending: SURGERY
Payer: MEDICARE